# Patient Record
Sex: MALE | HISPANIC OR LATINO
[De-identification: names, ages, dates, MRNs, and addresses within clinical notes are randomized per-mention and may not be internally consistent; named-entity substitution may affect disease eponyms.]

---
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
## 2022-02-07 ENCOUNTER — OFFICE VISIT (OUTPATIENT)
Dept: INTERNAL MEDICINE | Facility: OTHER | Age: 22
End: 2022-02-07
Payer: COMMERCIAL

## 2022-02-07 VITALS
SYSTOLIC BLOOD PRESSURE: 118 MMHG | HEART RATE: 101 BPM | BODY MASS INDEX: 35.62 KG/M2 | OXYGEN SATURATION: 95 % | WEIGHT: 241.2 LBS | DIASTOLIC BLOOD PRESSURE: 81 MMHG | TEMPERATURE: 98 F

## 2022-02-07 DIAGNOSIS — F84.0 AUTISM: ICD-10-CM

## 2022-02-07 PROBLEM — R48.8 OTHER SYMBOLIC DYSFUNCTIONS: Status: ACTIVE | Noted: 2021-09-23

## 2022-02-07 PROBLEM — L70.9 ACNE, MILD: Status: ACTIVE | Noted: 2020-08-24

## 2022-02-07 PROCEDURE — 99213 OFFICE O/P EST LOW 20 MIN: CPT | Performed by: STUDENT IN AN ORGANIZED HEALTH CARE EDUCATION/TRAINING PROGRAM

## 2022-02-07 ASSESSMENT — PATIENT HEALTH QUESTIONNAIRE - PHQ9: CLINICAL INTERPRETATION OF PHQ2 SCORE: 0

## 2022-02-07 NOTE — PROGRESS NOTES
"Subjective     Jorge Hadley is a 22 y.o. male with Autism. who presents with his father for  Follow-Up  last visit seen for flu like symptoms, negative for COVID as per father. Now completley resolved and doing much better. History is obtained from father as patient is unable to answer the questions.  Patient speaks less and usually just repeats what we asked him.  Normal articulation.  No aggressive behavior.  Goes to autistic school.  No acute concerns at present. He is doing better with his diet and exercise. For last 6 months has been using treadmill at home for exercise and has lost about 15-20 lbs total and has been maintaining it.  Patient is vaccinated for COVID.Discussed about other vaccinations including meningococcal, flu and tdap counseling and education provided but Father declined vaccination.Declined any lab work at present.    ROS - info provided by father   Patient usually repeats what wwe ask or states \" I am doing good. Or Yes for most of the questions\".  Review of Systems   Constitutional: Negative.    HENT: Negative.    Respiratory: Negative.    Cardiovascular: Negative.    Gastrointestinal: Negative.    Genitourinary: Negative.    Musculoskeletal: Negative.    Skin:        Mild acne present on forehead       Objective     /81 (BP Location: Left arm, Patient Position: Sitting, BP Cuff Size: Adult)   Pulse (!) 101   Temp 36.7 °C (98 °F) (Temporal)   Wt 109 kg (241 lb 3.2 oz)   SpO2 95%   BMI 35.62 kg/m²      Physical Exam  Constitutional:       Appearance: He is obese.   HENT:      Head: Normocephalic and atraumatic.      Mouth/Throat:      Mouth: Mucous membranes are moist.      Pharynx: Oropharynx is clear.   Eyes:      Conjunctiva/sclera: Conjunctivae normal.   Cardiovascular:      Rate and Rhythm: Normal rate and regular rhythm.      Pulses: Normal pulses.      Heart sounds: Normal heart sounds.   Pulmonary:      Effort: Pulmonary effort is normal.      Breath sounds: Normal " breath sounds.   Musculoskeletal:      Right lower leg: No edema.      Left lower leg: No edema.   Skin:     General: Skin is warm.      Capillary Refill: Capillary refill takes less than 2 seconds.   Neurological:      General: No focal deficit present.      Mental Status: He is alert.   Psychiatric:         Mood and Affect: Mood normal.         Behavior: Behavior normal.           Assessment & Plan     Autism  Stable.  No behavioral issues  Not on any medication  Patient scheduled needlesticks so further decline any regular labs or vaccinations.  Plan  Continue current care  Provided information and details of social support for caregiver fatigue if needed.      BMI 35.0-35.9,adult  Obesity  Weight 261---> 241 in last 1 year  Moderate physical activity, daily 30 minutes to 1 hour walk on treadmill  Started having healthy diet  Plan  Appreciated for the weight loss  Recommended to continue regular exercise and low-calorie healthy diet

## 2022-02-08 ASSESSMENT — ENCOUNTER SYMPTOMS
RESPIRATORY NEGATIVE: 1
GASTROINTESTINAL NEGATIVE: 1
CARDIOVASCULAR NEGATIVE: 1
CONSTITUTIONAL NEGATIVE: 1
MUSCULOSKELETAL NEGATIVE: 1

## 2022-02-08 NOTE — PATIENT INSTRUCTIONS
Follow up in 6 months for well visit /annual visit  Continue healthy diet and exercise  Congratulations! For weight loss, Keep it Up!  Recommended flu , tdap and meningococcal vaccine

## 2022-02-08 NOTE — ASSESSMENT & PLAN NOTE
Obesity  Weight 261---> 241 in last 1 year  Moderate physical activity, daily 30 minutes to 1 hour walk on treadmill  Started having healthy diet  Plan  Appreciated for the weight loss  Recommended to continue regular exercise and low-calorie healthy diet

## 2022-02-08 NOTE — ASSESSMENT & PLAN NOTE
Stable.  No behavioral issues  Not on any medication  Patient scheduled needlesticks so further decline any regular labs or vaccinations.  Plan  Continue current care  Provided information and details of social support for caregiver fatigue if needed.

## 2023-07-06 ENCOUNTER — OFFICE VISIT (OUTPATIENT)
Dept: URGENT CARE | Facility: CLINIC | Age: 23
End: 2023-07-06
Payer: MEDICAID

## 2023-07-06 VITALS
WEIGHT: 232 LBS | HEART RATE: 74 BPM | SYSTOLIC BLOOD PRESSURE: 126 MMHG | DIASTOLIC BLOOD PRESSURE: 82 MMHG | OXYGEN SATURATION: 95 % | HEIGHT: 70 IN | TEMPERATURE: 97.4 F | BODY MASS INDEX: 33.21 KG/M2 | RESPIRATION RATE: 18 BRPM

## 2023-07-06 DIAGNOSIS — H60.391 OTHER INFECTIVE ACUTE OTITIS EXTERNA OF RIGHT EAR: ICD-10-CM

## 2023-07-06 PROCEDURE — 3074F SYST BP LT 130 MM HG: CPT | Performed by: PHYSICIAN ASSISTANT

## 2023-07-06 PROCEDURE — 99203 OFFICE O/P NEW LOW 30 MIN: CPT | Performed by: PHYSICIAN ASSISTANT

## 2023-07-06 PROCEDURE — 3079F DIAST BP 80-89 MM HG: CPT | Performed by: PHYSICIAN ASSISTANT

## 2023-07-06 RX ORDER — OFLOXACIN 3 MG/ML
5 SOLUTION AURICULAR (OTIC) DAILY
Qty: 10 ML | Refills: 0 | Status: SHIPPED | OUTPATIENT
Start: 2023-07-06 | End: 2023-11-21

## 2023-07-06 ASSESSMENT — ENCOUNTER SYMPTOMS
FEVER: 0
VOMITING: 0
COUGH: 0
DIARRHEA: 0

## 2023-07-07 ENCOUNTER — APPOINTMENT (OUTPATIENT)
Dept: INTERNAL MEDICINE | Facility: OTHER | Age: 23
End: 2023-07-07
Payer: MEDICAID

## 2023-07-07 NOTE — PROGRESS NOTES
"Subjective     Jorge Hadley is a very pleasant 23 y.o. male brought in by mother who presents with Ear Pain (Right ear pain, discharge of the right ear)            Patient autistic, all history elicited from mother.      Slight right ear discomfort.  Parents noted some bleeding and discharge from right ear canal this morning.  Patient is autistic so it is difficult to elicit a history from him.  Mother has not noted fever, congestion, cough, vomiting or diarrhea.  No pertinent past ear problems.      PMH:  has a past medical history of Autistic disorder, Cold, and Dental caries (3/20/2014).    He has no past medical history of Allergy, ASTHMA, or Diabetes.  MEDS:   Current Outpatient Medications:     ofloxacin otic sol (FLOXIN OTIC) 0.3 % Solution, Administer 5 Drops into the right ear every day., Disp: 10 mL, Rfl: 0    Dextromethorphan Polistirex (ROBITUSSIN 12 HOUR COUGH PO), Take  by mouth. (Patient not taking: Reported on 2/7/2022), Disp: , Rfl:   ALLERGIES: No Known Allergies  SURGHX:   Past Surgical History:   Procedure Laterality Date    DENTAL RESTORATION  3/20/2014    Performed by Vitor Durham D.D.S. at SURGERY SAME DAY [facility]    TOENAIL REMOVAL  3/20/2014    Performed by Scottie Iverson D.P.M. at [facility]GERY SAME DAY [email or web address]     SOCHX:  reports that he has never smoked. He has never used smokeless tobacco. He reports that he does not drink alcohol and does not use drugs.  FH: family history is not on file.      Review of Systems   Constitutional:  Negative for fever.   HENT:  Positive for ear discharge and ear pain. Negative for congestion.    Respiratory:  Negative for cough.    Gastrointestinal:  Negative for diarrhea and vomiting.       Medications, Allergies, and current problem list reviewed today in Epic           Objective     /82   Pulse 74   Temp 36.3 °C (97.4 °F) (Temporal)   Resp 18   Ht 1.778 m (5' 10\")   Wt 105 kg (232 lb)   SpO2 95%   BMI 33.29 kg/m²      Physical " Exam  Vitals and nursing note reviewed.   Constitutional:       General: He is not in acute distress.     Appearance: Normal appearance. He is well-developed. He is not diaphoretic.   HENT:      Head: Normocephalic.      Right Ear: Hearing and tympanic membrane normal. No decreased hearing noted. Drainage, swelling and tenderness present. No middle ear effusion. No mastoid tenderness. Tympanic membrane is not injected, perforated, erythematous, retracted or bulging.      Left Ear: Hearing, tympanic membrane, ear canal and external ear normal. No decreased hearing noted.      Nose: Nose normal. No congestion or rhinorrhea.      Mouth/Throat:      Mouth: Mucous membranes are moist.      Pharynx: No oropharyngeal exudate or posterior oropharyngeal erythema.   Eyes:      General:         Right eye: No discharge.         Left eye: No discharge.      Conjunctiva/sclera: Conjunctivae normal.   Cardiovascular:      Rate and Rhythm: Normal rate and regular rhythm.      Pulses: Normal pulses.      Heart sounds: Normal heart sounds.   Pulmonary:      Effort: Pulmonary effort is normal. No respiratory distress.      Breath sounds: Normal breath sounds.   Abdominal:      General: Abdomen is flat.      Palpations: Abdomen is soft.   Musculoskeletal:         General: Normal range of motion.      Cervical back: Normal range of motion and neck supple.      Right lower leg: No edema.      Left lower leg: No edema.   Lymphadenopathy:      Cervical: No cervical adenopathy.   Skin:     General: Skin is warm and dry.      Capillary Refill: Capillary refill takes less than 2 seconds.   Neurological:      General: No focal deficit present.      Mental Status: He is alert and oriented to person, place, and time.   Psychiatric:         Mood and Affect: Mood normal.                             Assessment & Plan     This is a very pleasant 23-year-old male brought in by mother for evaluation of right ear pain and discharge with bleeding since  today.  Patient is autistic so all history elicited from mother.  There have been no fever, cough, congestion, vomiting or diarrhea.  Vital signs are normal.  Exam shows right canal erythema with slight edema and bloody discharge.  There is no movement tenderness with manipulation of the auricle and tragus.  TM is clear bilaterally showing no infection or perforation.  No regional adenopathy.  Possible early otitis externa versus canal abrasion.  We will cover with antibiotic drops and warm compresses.    1. Other infective acute otitis externa of right ear  ofloxacin otic sol (FLOXIN OTIC) 0.3 % Solution          I personally reviewed prior external notes and test results pertinent to today's visit. Return to clinic or go to ED if symptoms worsen or persist. Red flag symptoms and indications for ED discussed at length. Patient/Parent/Guardian voices understanding. Follow-up with your primary care provider in 3-5 days. All side effects and potential interactions of prescribed medication discussed including allergic response, GI upset, tendon injury, rash, sedation, OCP effectiveness, etc.    Please note that this dictation was created using voice recognition software. I have made every reasonable attempt to correct obvious errors, but I expect that there are errors of grammar and possibly content that I did not discover before finalizing the note.

## 2023-11-21 ENCOUNTER — OFFICE VISIT (OUTPATIENT)
Dept: INTERNAL MEDICINE | Facility: OTHER | Age: 23
End: 2023-11-21
Payer: MEDICAID

## 2023-11-21 VITALS
SYSTOLIC BLOOD PRESSURE: 109 MMHG | OXYGEN SATURATION: 96 % | HEART RATE: 72 BPM | BODY MASS INDEX: 33.1 KG/M2 | WEIGHT: 231.2 LBS | HEIGHT: 70 IN | DIASTOLIC BLOOD PRESSURE: 61 MMHG | TEMPERATURE: 96.8 F

## 2023-11-21 DIAGNOSIS — Z00.00 ENCOUNTER FOR MEDICARE ANNUAL WELLNESS EXAM: ICD-10-CM

## 2023-11-21 PROCEDURE — G0439 PPPS, SUBSEQ VISIT: HCPCS | Mod: GC

## 2023-11-21 PROCEDURE — 1126F AMNT PAIN NOTED NONE PRSNT: CPT | Mod: GC

## 2023-11-21 PROCEDURE — 3074F SYST BP LT 130 MM HG: CPT

## 2023-11-21 PROCEDURE — 3078F DIAST BP <80 MM HG: CPT

## 2023-11-21 ASSESSMENT — PAIN SCALES - GENERAL: PAINLEVEL: NO PAIN

## 2023-11-21 ASSESSMENT — ACTIVITIES OF DAILY LIVING (ADL): BATHING_REQUIRES_ASSISTANCE: 1

## 2023-11-21 ASSESSMENT — PATIENT HEALTH QUESTIONNAIRE - PHQ9: CLINICAL INTERPRETATION OF PHQ2 SCORE: 0

## 2023-11-21 ASSESSMENT — ENCOUNTER SYMPTOMS: GENERAL WELL-BEING: GOOD

## 2023-11-21 NOTE — PROGRESS NOTES
Chief Complaint   Patient presents with    Annual Exam     Patient here for annual check up       HPI:  Jorge Hadley is a 23 y.o. 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     No current facility-administered medications for this visit.          Current supplements as per medication list.     Allergies: Patient has no known allergies.    Current social contact/activities: movies,walks 1 hour every day, singing, dances    He  reports that he has never smoked. He has never used smokeless tobacco. He reports that he does not drink alcohol and does not use drugs.  Counseling given: Not Answered      ROS:    Gait: Uses no assistive device  Ostomy: No  Other tubes: No  Amputations: No  Chronic oxygen use: No  Last eye exam: 09/23/2023  Wears hearing aids: No   : Denies any urinary leakage during the last 6 months    Screening:    Depression Screening  Little interest or pleasure in doing things?  0 - not at all  Feeling down, depressed , or hopeless? 0 - not at all  Trouble falling or staying asleep, or sleeping too much?  0   Feeling tired or having little energy?  0   Poor appetite or overeating? 0    Feeling bad about yourself - or that you are a failure or have let yourself or your family down? 0   Trouble concentrating on things, such as reading the newspaper or watching television?    Moving or speaking so slowly that other people could have noticed.  Or the opposite - being so fidgety or restless that you have been moving around a lot more than usual?  0   Thoughts that you would be better off dead, or of hurting yourself?  0   Patient Health Questionnaire Score: 0     If depressive symptoms  identified deferred to follow up visit unless specifically addressed in assessment and plan.    Interpretation of PHQ-9 Total Score   Score Severity   1-4 No Depression   5-9 Mild Depression   10-14 Moderate Depression   15-19 Moderately Severe Depression   20-27 Severe Depression    Screening for Cognitive Impairment  Do you or any of your friends or family members have any concern about your memory? No  Three Minute Recall (Banana, Sunrise, Chair)  0/3 Unable to recall  Kevin clock face with all 12 numbers and set the hands to show 20 past 8.    Unable to do due to autism  Cognitive concerns identified deferred for follow up unless specifically addressed in assessment and plan.    Fall Risk Assessment  Has the patient had two or more falls in the last year or any fall with injury in the last year?  No    Safety Assessment  Do you always wear your seatbelt?  Yes  Any changes to home needed to function safely? No  Difficulty hearing.  No  Patient counseled about all safety risks that were identified.    Functional Assessment ADLs  Are there any barriers preventing you from cooking for yourself or meeting nutritional needs?  Yes.    Are there any barriers preventing you from driving safely or obtaining transportation?  Yes.    Are there any barriers preventing you from using a telephone or calling for help?  Yes    Are there any barriers preventing you from shopping?  Yes.    Are there any barriers preventing you from taking care of your own finances?  Yes    Are there any barriers preventing you from managing your medications?  Yes    Are there any barriers preventing you from showering, bathing or dressing yourself? Yes, needs assistance    Are there any barriers preventing you from doing housework or laundry? No    Are there any barriers preventing you from using the toilet?No    Are you currently engaging in any exercise or physical activity?  Yes. Walks in tread mill every day one hour, dancing    Self-Assessment  of Health  What is your perception of your health? Good    Do you sleep more than six hours a night? Yes    In the past 7 days, how much did pain keep you from doing your normal work? None    Do you spend quality time with family or friends (virtually or in person)? Yes    Do you usually eat a heart healthy diet that constists of a variety of fruits, vegetables, whole grains and fiber? Yes    Do you eat foods high in fat and/or Fast Food more than three times per week? Yes    How concerned are you that your medical conditions are not being well managed? Not at all        Advance Care Planning  Do you have an Advance Directive, Living Will, Durable Power of , or POLST? No                 Health Maintenance Summary            Overdue - HIV Screening (Once) Overdue - never done      No completion history exists for this topic.              Overdue - Hepatitis C Screening (Once) Overdue - never done      No completion history exists for this topic.              Overdue - Meningococcal B Vaccine - Shared Decision Making (Once) Overdue - never done      No completion history exists for this topic.              Overdue - HPV Vaccines (1 - Male 2-dose series) Overdue - never done      No completion history exists for this topic.              Overdue - COVID-19 Vaccine (2 - Mixed Product series) Overdue since 3/25/2022      01/28/2022  Imm Admin: COVID-19 Vaccine, unspecified - HISTORICAL DATA              Overdue - Influenza Vaccine (1) Overdue since 9/1/2023 02/25/2019  Imm Admin: Influenza Vaccine Quad Inj (Pf)    02/01/2016  Imm Admin: Influenza Vaccine Quad Nasal    12/16/2013  Imm Admin: Influenza Vaccine Quad Inj (Pf)    10/21/2009  Imm Admin: Influenza Vaccine Pediatric Split - Historical Data    10/29/2008  Imm Admin: Influenza Vaccine Pediatric Split - Historical Data    Only the first 5 history entries have been loaded, but more history exists.              IMM DTaP/Tdap/Td Vaccine (7 - Td or Tdap)  Next due on 12/16/2023 12/16/2013  Imm Admin: Tdap Vaccine    02/14/2006  Imm Admin: Dtap Vaccine    01/30/2003  Imm Admin: Dtap Vaccine    2002  Imm Admin: Dtap Vaccine    2002  Imm Admin: Dtap Vaccine    Only the first 5 history entries have been loaded, but more history exists.              Hepatitis B Vaccine (Hep B) (Series Information) Completed      2002  Imm Admin: Hepatitis B Vaccine Adolescent/Pediatric    2002  Imm Admin: Hepatitis B Vaccine Adolescent/Pediatric    2002  Imm Admin: Hepatitis B Vaccine Adolescent/Pediatric              Pneumococcal Vaccine: 0-64 Years (Series Information) Aged Out      08/29/2003  Imm Admin: Pneumococcal Vaccine (PCV7) - HISTORICAL DATA    2002  Imm Admin: Pneumococcal Vaccine (PCV7) - HISTORICAL DATA    2002  Imm Admin: Pneumococcal Vaccine (PCV7) - HISTORICAL DATA    2002  Imm Admin: Pneumococcal Vaccine (PCV7) - HISTORICAL DATA              Hepatitis A Vaccine (Hep A) (Series Information) Completed      02/14/2006  Imm Admin: Hepatitis A Vaccine, Ped/Adol    01/20/2004  Imm Admin: Hepatitis A Vaccine, Ped/Adol              Polio Vaccine (Inactivated Polio) (Series Information) Completed      02/14/2006  Imm Admin: IPV    2002  Imm Admin: IPV    2002  Imm Admin: IPV    2002  Imm Admin: IPV              Chickenpox Vaccine (Varicella) (Series Information) Completed      04/05/2007  Imm Admin: Varicella Vaccine Live    01/30/2003  Imm Admin: Varicella Vaccine Live              Meningococcal Immunization (Series Information) Completed      02/12/2018  Imm Admin: Meningococcal Conjugate Vaccine MCV4 (MENVEO)    12/16/2013  Imm Admin: Meningococcal Conjugate Vaccine MCV4 (Menactra)                    Patient Care Team:  Frankie Melendrez D.O. as PCP - General (Internal Medicine)      Social History     Tobacco Use    Smoking status: Never    Smokeless tobacco: Never   Vaping Use    Vaping Use: Former  "  Substance Use Topics    Alcohol use: Never    Drug use: Never     No family history on file.  He  has a past medical history of Autistic disorder, Cold, and Dental caries (3/20/2014).    He has no past medical history of Allergy, ASTHMA, or Diabetes.   Past Surgical History:   Procedure Laterality Date    DENTAL RESTORATION  3/20/2014    Performed by Vitor Durham D.D.S. at [facility]    TOENAIL REMOVAL  3/20/2014    Performed by Scottie Iverson D.P.M. at [facility]       Exam:   /61 (BP Location: Right arm, Patient Position: Sitting, BP Cuff Size: Large adult long)   Pulse 72   Temp 36 °C (96.8 °F) (Temporal)   Ht 1.778 m (5' 10\")   Wt 105 kg (231 lb 3.2 oz)   SpO2 96%  Body mass index is 33.17 kg/m².    Hearing excellent.    Dentition good  Alert, oriented in no acute distress.  Eye contact is good, speech goal directed, affect calm    Assessment and Plan. The following treatment and monitoring plan is recommended:    1. Encounter for Medicare annual wellness exam    Deferring vaccinations at this time, as patient not interested. Has autism, and does not like needle sticks. Will discuss next visit in 6 months for labs/vaccines.    Services suggested: No services needed at this time  Health Care Screening: Age-appropriate preventive services recommended by USPTF and ACIP covered by Medicare were discussed today. Services ordered if indicated and agreed upon by the patient.  Referrals offered: Community-based lifestyle interventions to reduce health risks and promote self-management and wellness, fall prevention, nutrition, physical activity, tobacco-use cessation, weight loss, and mental health services as per orders if indicated.    Discussion today about general wellness and lifestyle habits:    Prevent falls and reduce trip hazards; Cautioned about securing or removing rugs.  Have a working fire alarm and carbon monoxide detector;   Engage in regular physical " activity and social activities     Follow-up: No follow-ups on file.

## 2023-11-21 NOTE — PATIENT INSTRUCTIONS
He is due for HIV screening, Hepatitis C screening    He is also due for COVID vaccinations x2,  meningitis vaccine, HPV vaccine, as well as Influenza vaccination

## 2024-05-13 ENCOUNTER — APPOINTMENT (OUTPATIENT)
Dept: INTERNAL MEDICINE | Facility: OTHER | Age: 24
End: 2024-05-13
Payer: MEDICAID

## 2024-12-18 ENCOUNTER — OFFICE VISIT (OUTPATIENT)
Dept: INTERNAL MEDICINE | Facility: OTHER | Age: 24
End: 2024-12-18
Payer: COMMERCIAL

## 2024-12-18 VITALS
HEART RATE: 75 BPM | TEMPERATURE: 97.4 F | DIASTOLIC BLOOD PRESSURE: 74 MMHG | SYSTOLIC BLOOD PRESSURE: 120 MMHG | HEIGHT: 70 IN | WEIGHT: 222 LBS | OXYGEN SATURATION: 94 % | BODY MASS INDEX: 31.78 KG/M2

## 2024-12-18 DIAGNOSIS — E66.811 CLASS 1 OBESITY DUE TO EXCESS CALORIES WITHOUT SERIOUS COMORBIDITY WITH BODY MASS INDEX (BMI) OF 31.0 TO 31.9 IN ADULT: ICD-10-CM

## 2024-12-18 DIAGNOSIS — F99 ANNUAL PHYSICAL EXAMINATION FOR PERSON WITH MENTAL ILLNESS COMPLETED: ICD-10-CM

## 2024-12-18 DIAGNOSIS — E66.09 CLASS 1 OBESITY DUE TO EXCESS CALORIES WITHOUT SERIOUS COMORBIDITY WITH BODY MASS INDEX (BMI) OF 31.0 TO 31.9 IN ADULT: ICD-10-CM

## 2024-12-18 DIAGNOSIS — Z00.00 ANNUAL PHYSICAL EXAMINATION FOR PERSON WITH MENTAL ILLNESS COMPLETED: ICD-10-CM

## 2024-12-18 PROCEDURE — 3078F DIAST BP <80 MM HG: CPT

## 2024-12-18 PROCEDURE — 99213 OFFICE O/P EST LOW 20 MIN: CPT | Mod: GE

## 2024-12-18 PROCEDURE — 3074F SYST BP LT 130 MM HG: CPT

## 2024-12-18 NOTE — PATIENT INSTRUCTIONS
Thank you for coming today!   Please remember a balanced lifestyle helps to keep your heart healthy. Some tips for a healthy heart include:  Exercise for 30 minutes, at least 5 times a week or 1 hour 3 times a week.  Great job on your exercise routine and on losing weight!   Avoid fatty, processed, sweetened food.

## 2024-12-19 NOTE — PROGRESS NOTES
Office Visit Initial Encounter    Chief Complaint   Patient presents with    Follow-Up     - normal check up        HPI   Mr. Hadley is a 24 yoM with history of autism and BMI 31, who presents for an annual physical evaluation. Presents with his father. Lives with parents and 2 siblings, one of his siblings has also been diagnosed with autism. Patient is mostly non verbal, father reports no complaints or concerns with his son's health. Since he finished school, his diet has improved dramatically in terms of vegetable and fruits intake, less processed food, mostly home meals. He samuel 1-2h of treadmill running at home every day. This has resulted in significant weight loss over the last year.  He is independent for all ADLs with some supervision, not any IADLs.       Past Medical History  Past Medical History:   Diagnosis Date    Autistic disorder     Cold     1/2/2014    Dental caries 3/20/2014     ICD-10 transition       Allergies:     Patient has no known allergies.    Medications  No current outpatient medications on file.    Family History  No family history on file.    Surgical History  Past Surgical History:   Procedure Laterality Date    DENTAL RESTORATION  3/20/2014    Performed by Vitor Durham D.D.S. at [facility]    TOENAIL REMOVAL  3/20/2014    Performed by Scottie Iverson D.P.M. at [facility]       Social History   Social History     Tobacco Use    Smoking status: Never    Smokeless tobacco: Never   Vaping Use    Vaping status: Former   Substance Use Topics    Alcohol use: Never    Drug use: Never       ROS     Per father, no evidence of any of the following:   Fevers, chills, night sweats, weight loss or gain.  H&N: Hearing changes, vision changes, eye pain, ear pain, nasal discharge, sore throat, no neck swelling.  Pulmonary: Shortness of breath, cough, sputum, or hemoptysis.  Cardiovascular: Chest pain, palpitations, or LE swelling.   GI: Nausea, vomiting,  "diarrhea, constipation, abdominal pain, hematochezia or melena. Not incontinent.   : No dysuria, frequency, retention or hematuria.   Neuro: No headaches, no lightheadedness, no dizziness. No focal weakness, no general weakness. No gait disturbances.   Psych: No anxiety or depression, or agitation.     Physical Exam     /74 (BP Location: Left arm, Patient Position: Sitting, BP Cuff Size: Adult)   Pulse 75   Temp 36.3 °C (97.4 °F) (Temporal)   Ht 1.79 m (5' 10.47\")   Wt 101 kg (222 lb)   SpO2 94%   BMI 31.43 kg/m²     General: No acute distress, comfortable.   Head and Neck: NC/AT, EOMI, no scleral icterus or conjunctival pallor. Neck supple, no LADs.   CV: Rhythmic heart sounds, no murmurs, gallops or rubs.   Pulm: Chest expansion is symmetrical, no crackles, rales, rhonchi, or wheezing.  GI: Flat, non distended, soft, non tender, normal bowel sounds.  Skin: Warm, no rashes in exposed areas.  MSK: Normal ROM. No lower extremity edema. No lesions.   Neuro: Patient is alert, good eye contact. Speech is not clear or goal directed, repetitive. Pupils equal, round and reactive to light. Good eye contact. Extra ocular movements intact. Facial and body symmetry without obvious focal deficit.     Diagnostic tests    I have reviewed all pertinent labs and diagnostic tests associated with this visit with specific comments listed under the assessment and plan below    Assessment and Plan    Annual physical exam  No abnormalities on exam or ROS.   - Continue to monitor clinically, annual   - Father declines STD screening given no risks   - Father declines vaccines at this time   - Continue to follow with dentist, optometry, social work     BMI 31   Prior BMI 33.7 - improvement associated to lifestyle changes.   - Praised their excellent work on home meals and exercise routine     Return in about 1 year (around 12/18/2025).    Yocasta MOREJON PGY-3  Internal Medicine [employer]    Pt has been discussed with the " Attending Physician

## 2025-02-25 ENCOUNTER — OFFICE VISIT (OUTPATIENT)
Dept: INTERNAL MEDICINE | Facility: OTHER | Age: 25
End: 2025-02-25
Payer: COMMERCIAL

## 2025-02-25 VITALS
HEART RATE: 72 BPM | DIASTOLIC BLOOD PRESSURE: 75 MMHG | BODY MASS INDEX: 32.78 KG/M2 | OXYGEN SATURATION: 97 % | SYSTOLIC BLOOD PRESSURE: 117 MMHG | WEIGHT: 229 LBS | HEIGHT: 70 IN | TEMPERATURE: 97.2 F

## 2025-02-25 DIAGNOSIS — E66.811 OBESITY (BMI 30.0-34.9): ICD-10-CM

## 2025-02-25 DIAGNOSIS — F84.0 AUTISM: ICD-10-CM

## 2025-02-25 ASSESSMENT — ENCOUNTER SYMPTOMS
FEVER: 0
COUGH: 0
FLANK PAIN: 0
BLOOD IN STOOL: 0
ABDOMINAL PAIN: 0
DEPRESSION: 0
NERVOUS/ANXIOUS: 0
SEIZURES: 0
SHORTNESS OF BREATH: 0
NAUSEA: 0
VOMITING: 0
PALPITATIONS: 0
CHILLS: 0
LOSS OF CONSCIOUSNESS: 0
HEARTBURN: 0

## 2025-02-25 ASSESSMENT — PAIN SCALES - GENERAL: PAINLEVEL_OUTOF10: NO PAIN

## 2025-02-25 NOTE — ASSESSMENT & PLAN NOTE
Patient has a history of autism, behaviorally stable and not on any medications at this time.  Unfortunately scared of needle sticks/obtaining regular lab draws.  Per father, enough support at home at this time and without issues regarding caregiver burden.  Does want to establish with a neurologist however.    -Refer to neurology placed  -Work letter also provided that patient needs direct supervision

## 2025-02-25 NOTE — LETTER
February 25, 2025    To Whom It May Concern:         This is confirmation that Jorge Hadley attended his scheduled appointment with Frankie Melendrez D.O. on 2/25/25. Jorge has autism and will be needing direct supervision given his inability to take care of himself. He otherwise is behaviorally stable, however this needs to be taken into account for any future occupation that Jorge partakes in.         If you have any questions please do not hesitate to call me at the phone number listed below.    Sincerely,          Magalie Feliciano, Med Ass't  [phone]

## 2025-02-25 NOTE — ASSESSMENT & PLAN NOTE
Current BMI 32.42, has been trying to be more physically active with stationary bike at home, however diet all over the place.    -Counseled on lifestyle modifications

## 2025-02-25 NOTE — LETTER
February 25, 2025    To Whom It May Concern:         This is confirmation that Jorge Hadley attended his scheduled appointment with Frankie Melendrez D.O. on 2/25/25. Jorge has autism and will be needing direct supervision given his inability to take care of himself. He otherwise is behaviorally stable, however this needs to be taken into account for any future occupation that Jorge partakes in.         If you have any questions please do not hesitate to call me at the phone number listed below.    Sincerely,          Frankie Melendrez D.O.  [phone]

## 2025-02-25 NOTE — PROGRESS NOTES
Established Patient    Patient Care Team:  Frankie Melendrez D.O. as PCP - General (Internal Medicine)    Jorge Hadley is a 25 y.o. male who presents today with the following Chief Complaint(s): Follow up for Diagnoses of Autism and Obesity (BMI 30.0-34.9) were pertinent to this visit.    HPI:  Patient is a 24 yo male with history of autism presenting for routine follow-up visit. Accompanied by his father whom is providing most of the history. Reports that Jorge has overall been doing well and behaviorally stable. No current complaints at this time. They do want to however establish Jorge with a neurologist for general establishment of care and consideration of deep brain stimulation. Also requesting a letter stating that Jorge will need constant supervision at his place of work (currently not with a job however looking for one). He has been becoming more physically active and has been using stationary bike regularly. Needing to defer vaccinations as Jorge is scared of needles at this time.    Review of Systems   Constitutional:  Negative for chills and fever.   Respiratory:  Negative for cough and shortness of breath.    Cardiovascular:  Negative for chest pain and palpitations.   Gastrointestinal:  Negative for abdominal pain, blood in stool, heartburn, nausea and vomiting.   Genitourinary:  Negative for dysuria, flank pain and hematuria.   Neurological:  Negative for seizures and loss of consciousness.   Psychiatric/Behavioral:  Negative for depression. The patient is not nervous/anxious.        Past Medical History:   Diagnosis Date    Autistic disorder     Cold     1/2/2014    Dental caries 3/20/2014     ICD-10 transition     Social History     Tobacco Use    Smoking status: Never    Smokeless tobacco: Never   Vaping Use    Vaping status: Former   Substance Use Topics    Alcohol use: Never    Drug use: Never     No current outpatient medications on file.     No current facility-administered medications for this  "visit.       /75 (BP Location: Right arm, Patient Position: Sitting, BP Cuff Size: Large adult)   Pulse 72   Temp 36.2 °C (97.2 °F) (Temporal)   Ht 1.79 m (5' 10.47\")   Wt 104 kg (229 lb)   SpO2 97%   BMI 32.42 kg/m²   Physical Exam  Constitutional:       General: He is not in acute distress.     Appearance: He is obese.   Cardiovascular:      Rate and Rhythm: Normal rate and regular rhythm.      Pulses: Normal pulses.      Heart sounds: Normal heart sounds. No murmur heard.     No friction rub. No gallop.   Pulmonary:      Breath sounds: No wheezing, rhonchi or rales.   Abdominal:      General: Abdomen is flat.      Palpations: Abdomen is soft.      Tenderness: There is no abdominal tenderness. There is no guarding or rebound.   Musculoskeletal:      Right lower leg: No edema.      Left lower leg: No edema.   Skin:     General: Skin is warm.      Capillary Refill: Capillary refill takes less than 2 seconds.   Neurological:      Mental Status: He is alert. Mental status is at baseline.         Assessment and Plan:     Autism  Patient has a history of autism, behaviorally stable and not on any medications at this time.  Unfortunately scared of needle sticks/obtaining regular lab draws.  Per father, enough support at home at this time and without issues regarding caregiver burden.  Does want to establish with a neurologist however.    -Refer to neurology placed  -Work letter also provided that patient needs direct supervision    Obesity (BMI 30.0-34.9)  Current BMI 32.42, has been trying to be more physically active with stationary bike at home, however diet all over the place.    -Counseled on lifestyle modifications      Orders Placed This Encounter    Referral to Neurology       Return in about 6 months (around 8/25/2025).    This note was created using voice recognition software. While every attempt is made to ensure accuracy of transcription, occasionally errors occur.     Frankie Melendrez D.O. PGY " III  Internal Medicine  [facility]

## 2025-03-04 ENCOUNTER — TELEPHONE (OUTPATIENT)
Dept: HEALTH INFORMATION MANAGEMENT | Facility: OTHER | Age: 25
End: 2025-03-04
Payer: COMMERCIAL